# Patient Record
Sex: FEMALE | URBAN - METROPOLITAN AREA
[De-identification: names, ages, dates, MRNs, and addresses within clinical notes are randomized per-mention and may not be internally consistent; named-entity substitution may affect disease eponyms.]

---

## 2021-08-19 ENCOUNTER — OFFICE VISIT (OUTPATIENT)
Dept: URBAN - METROPOLITAN AREA CLINIC 76 | Facility: CLINIC | Age: 71
End: 2021-08-19
Payer: MEDICARE

## 2021-08-19 DIAGNOSIS — H52.4 PRESBYOPIA: ICD-10-CM

## 2021-08-19 DIAGNOSIS — H25.11 AGE-RELATED NUCLEAR CATARACT, RIGHT EYE: ICD-10-CM

## 2021-08-19 PROCEDURE — 92134 CPTRZ OPH DX IMG PST SGM RTA: CPT | Performed by: OPHTHALMOLOGY

## 2021-08-19 PROCEDURE — 99204 OFFICE O/P NEW MOD 45 MIN: CPT | Performed by: OPHTHALMOLOGY

## 2021-08-19 ASSESSMENT — VISUAL ACUITY
OD: 20/40
OS: 20/40

## 2021-08-19 ASSESSMENT — INTRAOCULAR PRESSURE
OS: 15
OD: 15

## 2021-08-19 ASSESSMENT — KERATOMETRY
OD: 44.25
OS: 44.75

## 2021-08-19 NOTE — IMPRESSION/PLAN
Impression: Puckering of macula, left eye: H35.372. MAC OCT reviewed today. Plan: Discussed added risks. Recommend retina consult prior to cataract surgery.

## 2021-08-19 NOTE — IMPRESSION/PLAN
Impression: Age-related nuclear cataract, bilateral: H25.13. Visually significant. Plan: Cataracts account for the patient's complaints. Discussed all risks, benefits, procedures and recovery. Patient understands changing glasses will not improve vision. Patient desires to have surgery, recommend CE IOL OU, OS first.  Discussed added risk(s) due to maturity of cataracts, ERM OS. Discussed IOL options, recommend STANDARD IOL. TARGET: DISTANCE.  RL2. Pt needs ASCAN. Recommend DEXYCU + MOXI injection and ORA. Discussed astigmatism diagnosis with patient. Patient understands that standard lens does not correct for astigmatism and patient will need gls for distance and near after cataract surgery. Patient understands. Will rely on Dr. Reagan Franks for primary and post operative eye care.

## 2021-09-24 ENCOUNTER — OFFICE VISIT (OUTPATIENT)
Dept: URBAN - METROPOLITAN AREA CLINIC 76 | Facility: CLINIC | Age: 71
End: 2021-09-24
Payer: MEDICARE

## 2021-09-24 PROCEDURE — 99213 OFFICE O/P EST LOW 20 MIN: CPT | Performed by: OPHTHALMOLOGY

## 2021-09-24 PROCEDURE — 92134 CPTRZ OPH DX IMG PST SGM RTA: CPT | Performed by: OPHTHALMOLOGY

## 2021-09-24 ASSESSMENT — INTRAOCULAR PRESSURE
OS: 14
OD: 14

## 2021-09-24 NOTE — IMPRESSION/PLAN
Impression: Age-related nuclear cataract, bilateral: H25.13. Bilateral. Plan: No retinal contraindication to CE IOL.

## 2021-09-24 NOTE — IMPRESSION/PLAN
Impression: Puckering of macula, left eye: H35.372. Left.
lamellar hole Plan: OCT ordered and performed today. Discussed diagnosis with patient. The clinical exam was consistent with Epiretinal membrane. The diagnosis and natural history and prognosis of Epiretinal membrane, as well as the risks and benefits with Vitrectomy with membrane peeling were discussed. Given the current visual acuity  the patient elects to observe for now. 
Patient was advised to call if the vision continued to become worse, if the vision is better s/p CE then no need to return for DE/OCT

## 2021-11-04 ENCOUNTER — PRE-OPERATIVE VISIT (OUTPATIENT)
Dept: URBAN - METROPOLITAN AREA CLINIC 76 | Facility: CLINIC | Age: 71
End: 2021-11-04
Payer: MEDICARE

## 2021-11-04 DIAGNOSIS — H25.13 AGE-RELATED NUCLEAR CATARACT, BILATERAL: Primary | ICD-10-CM

## 2021-11-04 ASSESSMENT — PACHYMETRY
OD: 2.56
OD: 22.56
OS: 22.55
OS: 2.76

## 2022-01-10 ENCOUNTER — OFFICE VISIT (OUTPATIENT)
Dept: URBAN - METROPOLITAN AREA CLINIC 76 | Facility: CLINIC | Age: 72
End: 2022-01-10
Payer: MEDICARE

## 2022-01-10 PROCEDURE — 99213 OFFICE O/P EST LOW 20 MIN: CPT | Performed by: OPHTHALMOLOGY

## 2022-01-10 PROCEDURE — 92134 CPTRZ OPH DX IMG PST SGM RTA: CPT | Performed by: OPHTHALMOLOGY

## 2022-01-10 ASSESSMENT — INTRAOCULAR PRESSURE
OD: 15
OS: 14

## 2022-01-10 NOTE — IMPRESSION/PLAN
Impression: Age-related nuclear cataract, bilateral: H25.13 Bilateral. Plan: As scheduled w/ Dr. Mari Gramajo, Not recommending combo at this time.

## 2022-02-28 ENCOUNTER — OFFICE VISIT (OUTPATIENT)
Dept: URBAN - METROPOLITAN AREA CLINIC 76 | Facility: CLINIC | Age: 72
End: 2022-02-28
Payer: MEDICARE

## 2022-02-28 DIAGNOSIS — H35.372 PUCKERING OF MACULA, LEFT EYE: ICD-10-CM

## 2022-02-28 PROCEDURE — 92014 COMPRE OPH EXAM EST PT 1/>: CPT | Performed by: OPHTHALMOLOGY

## 2022-02-28 RX ORDER — PREDNISOLONE ACETATE 10 MG/ML
1 % SUSPENSION/ DROPS OPHTHALMIC
Qty: 5 | Refills: 1 | Status: ACTIVE
Start: 2022-02-28

## 2022-02-28 ASSESSMENT — KERATOMETRY
OD: 44.00
OS: 44.50

## 2022-02-28 ASSESSMENT — VISUAL ACUITY
OD: 20/30
OS: 20/30

## 2022-02-28 ASSESSMENT — INTRAOCULAR PRESSURE
OD: 14
OS: 15

## 2022-02-28 NOTE — IMPRESSION/PLAN
Impression: Age-related nuclear cataract, bilateral: H25.13. Visually significant. Plan: Cataracts account for the patient's complaints. Discussed all risks, benefits, procedures and recovery. Patient understands changing glasses will not improve vision. Patient desires to have surgery, recommend CE IOL OU, OS first.  Discussed added risk(s) due to maturity of cataracts, ERM OS. Discussed IOL options, recommend STANDARD IOL. TARGET: DISTANCE.  RL2. Pt needs ASCAN. Recommend DEXYCU + MOXI injection and ORA. Will plan on steroid supplementation OS with Pred QID x 2 weeks and taper weekly. Discussed astigmatism diagnosis with patient. Patient understands that standard lens does not correct for astigmatism and patient will need gls for distance and near after cataract surgery. Patient understands. Will rely on Dr. Silvano Smalls for primary and post operative eye care.

## 2022-02-28 NOTE — IMPRESSION/PLAN
Impression: Puckering of macula, left eye: H35.372. Plan: Discussed added risks. Cleared by Dr. Odalis Kim.

## 2022-03-07 ENCOUNTER — SURGERY (OUTPATIENT)
Dept: URBAN - METROPOLITAN AREA SURGERY 47 | Facility: SURGERY | Age: 72
End: 2022-03-07
Payer: MEDICARE

## 2022-03-07 PROCEDURE — PR1CP PR1CP: CUSTOM | Performed by: OPHTHALMOLOGY

## 2022-03-21 ENCOUNTER — SURGERY (OUTPATIENT)
Dept: URBAN - METROPOLITAN AREA SURGERY 47 | Facility: SURGERY | Age: 72
End: 2022-03-21
Payer: MEDICARE

## 2022-03-21 PROCEDURE — PR1CP PR1CP: CUSTOM | Performed by: OPHTHALMOLOGY
